# Patient Record
Sex: FEMALE | Race: WHITE
[De-identification: names, ages, dates, MRNs, and addresses within clinical notes are randomized per-mention and may not be internally consistent; named-entity substitution may affect disease eponyms.]

---

## 2019-09-22 ENCOUNTER — HOSPITAL ENCOUNTER (OUTPATIENT)
Dept: HOSPITAL 53 - M WUC | Age: 29
End: 2019-09-22
Attending: PHYSICIAN ASSISTANT
Payer: OTHER GOVERNMENT
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
## 2019-09-22 DIAGNOSIS — M25.561: Primary | ICD-10-CM
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
## 2019-09-23 NOTE — REP
RIGHT KNEE, FIVE VIEWS:

 

There is no evidence of an acute fracture, dislocation or intrinsic bone

disease.

 

IMPRESSION:

 

No fracture or dislocation.

 

 

Electronically Signed by

Bj Riley MD 09/24/2019 09:45 A

## 2021-11-13 ENCOUNTER — HOSPITAL ENCOUNTER (OUTPATIENT)
Dept: HOSPITAL 53 - M WUC | Age: 31
End: 2021-11-13
Attending: PHYSICIAN ASSISTANT
Payer: COMMERCIAL

## 2021-11-13 DIAGNOSIS — R30.0: Primary | ICD-10-CM

## 2022-09-18 ENCOUNTER — HOSPITAL ENCOUNTER (EMERGENCY)
Dept: HOSPITAL 53 - M ED | Age: 32
Discharge: HOME | End: 2022-09-18
Payer: COMMERCIAL

## 2022-09-18 VITALS — BODY MASS INDEX: 23.9 KG/M2 | WEIGHT: 129.85 LBS | HEIGHT: 62 IN

## 2022-09-18 VITALS — SYSTOLIC BLOOD PRESSURE: 126 MMHG | DIASTOLIC BLOOD PRESSURE: 82 MMHG

## 2022-09-18 DIAGNOSIS — S61.412A: Primary | ICD-10-CM

## 2022-09-18 DIAGNOSIS — Z79.3: ICD-10-CM
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
